# Patient Record
Sex: FEMALE | Race: WHITE | Employment: FULL TIME
[De-identification: names, ages, dates, MRNs, and addresses within clinical notes are randomized per-mention and may not be internally consistent; named-entity substitution may affect disease eponyms.]

---

## 2017-06-10 ENCOUNTER — HEALTH MAINTENANCE LETTER (OUTPATIENT)
Age: 52
End: 2017-06-10

## 2019-09-30 ENCOUNTER — HEALTH MAINTENANCE LETTER (OUTPATIENT)
Age: 54
End: 2019-09-30

## 2021-01-15 ENCOUNTER — HEALTH MAINTENANCE LETTER (OUTPATIENT)
Age: 56
End: 2021-01-15

## 2021-03-14 ENCOUNTER — HEALTH MAINTENANCE LETTER (OUTPATIENT)
Age: 56
End: 2021-03-14

## 2021-10-24 ENCOUNTER — HEALTH MAINTENANCE LETTER (OUTPATIENT)
Age: 56
End: 2021-10-24

## 2022-02-13 ENCOUNTER — HEALTH MAINTENANCE LETTER (OUTPATIENT)
Age: 57
End: 2022-02-13

## 2022-04-10 ENCOUNTER — HEALTH MAINTENANCE LETTER (OUTPATIENT)
Age: 57
End: 2022-04-10

## 2022-10-15 ENCOUNTER — HEALTH MAINTENANCE LETTER (OUTPATIENT)
Age: 57
End: 2022-10-15

## 2023-03-11 ENCOUNTER — OFFICE VISIT (OUTPATIENT)
Dept: URGENT CARE | Facility: URGENT CARE | Age: 58
End: 2023-03-11
Payer: COMMERCIAL

## 2023-03-11 ENCOUNTER — ANCILLARY PROCEDURE (OUTPATIENT)
Dept: GENERAL RADIOLOGY | Facility: CLINIC | Age: 58
End: 2023-03-11
Attending: FAMILY MEDICINE
Payer: COMMERCIAL

## 2023-03-11 VITALS
TEMPERATURE: 98.2 F | RESPIRATION RATE: 20 BRPM | HEART RATE: 80 BPM | DIASTOLIC BLOOD PRESSURE: 100 MMHG | OXYGEN SATURATION: 98 % | SYSTOLIC BLOOD PRESSURE: 169 MMHG

## 2023-03-11 DIAGNOSIS — S93.402A MILD ANKLE SPRAIN, LEFT, INITIAL ENCOUNTER: ICD-10-CM

## 2023-03-11 DIAGNOSIS — M25.572 PAIN IN JOINT, ANKLE AND FOOT, LEFT: Primary | ICD-10-CM

## 2023-03-11 DIAGNOSIS — M25.572 PAIN IN JOINT, ANKLE AND FOOT, LEFT: ICD-10-CM

## 2023-03-11 PROCEDURE — 73610 X-RAY EXAM OF ANKLE: CPT | Mod: TC | Performed by: RADIOLOGY

## 2023-03-11 PROCEDURE — 99203 OFFICE O/P NEW LOW 30 MIN: CPT | Performed by: FAMILY MEDICINE

## 2023-03-11 ASSESSMENT — PAIN SCALES - GENERAL: PAINLEVEL: SEVERE PAIN (6)

## 2023-03-11 NOTE — PATIENT INSTRUCTIONS
Place ice onto the left ankle area for 20 minutes at a time, every 2 hours while awake.      Trace the capital letters of the alphabet with the left big toe to rehabilitate the left ankle.      Elevate the left foot above the level of the heart to decrease some of the pain.      Take Ibuprofen, Tylenol for the pain.      Follow up if not better in 2 weeks.

## 2023-03-11 NOTE — PROGRESS NOTES
SUBJECTIVE:   Katharina De is a 57 year old female presenting with a chief complaint of pain and swelling at the anterior and lateral left ankle after falling this morning.  Patient slipped on an icy parking lot and somehow fell.  .  .  The pain is worse with walking and plantarflexion and with lateral deviation and better when keeping the left ankle still.    Course of illness is still present. .    Severity:  Pain ratin out of 10.    Current and Associated symptoms: swelling.   Treatment measures tried include Ibuprofen.  ..    Past Medical History:   Diagnosis Date     GERD (gastroesophageal reflux disease)      Current Outpatient Medications   Medication Sig Dispense Refill     ferrous sulfate (IRON) 325 (65 FE) MG tablet Take 1 tablet (325 mg) by mouth 2 times daily 60 tablet 0     multivitamin, therapeutic (THERA-VIT) TABS Take 1 tablet by mouth daily       pantoprazole (PROTONIX) 40 MG enteric coated tablet Take 1 tablet (40 mg) by mouth 2 times daily Take 30-60 minutes before a meal. 30 tablet 0     POTASSIUM PO Take 1 tablet by mouth daily OTC potassium supplement (3%)       Social History     Tobacco Use     Smoking status: Former     Smokeless tobacco: Not on file   Substance Use Topics     Alcohol use: Yes     Comment: rare       ROS:  CONSTITUTIONAL:negative for fevers.    MUSCULOSKELETAL:  Positive for left ankle pain.      OBJECTIVE:  BP (!) 169/100   Pulse 80   Temp 98.2  F (36.8  C) (Tympanic)   Resp 20   SpO2 98%   GENERAL APPEARANCE: healthy, alert and no distress  Extremities: positive for mild edema at the lateral malleolus area.  There is some tenderness with palpation over the areas anterior and inferior to the lateral malleolus.  There is also some lateral dorsal mid-foot tenderness.    SKIN: no ecchymosis/hematoma.    GAIT:  Patient is able to walk on the left foot.      X-rays of the left ankle:  I viewed all X-ray images during this clinic encounter.  The X-rays of the left ankle  showed no acute fractures/avulsions/dislocations.      ASSESSMENT:  Left ankle pain  Mild Left Ankle Sprain. Today's X-rays of the left ankle showed no fracture/avulsions/dislocations.      PLAN:  Elevation, Ice      An AirCast Left Ankle stirrup splint was placed onto the left ankle.      ROM exercises    Ibuprofen, Tylenol    follow up if not better in 2 weeks.      Alonzo Del Castillo MD

## 2023-03-26 ENCOUNTER — HEALTH MAINTENANCE LETTER (OUTPATIENT)
Age: 58
End: 2023-03-26

## 2024-05-26 ENCOUNTER — HEALTH MAINTENANCE LETTER (OUTPATIENT)
Age: 59
End: 2024-05-26

## 2025-06-14 ENCOUNTER — HEALTH MAINTENANCE LETTER (OUTPATIENT)
Age: 60
End: 2025-06-14